# Patient Record
Sex: MALE | Race: WHITE | Employment: FULL TIME | URBAN - METROPOLITAN AREA
[De-identification: names, ages, dates, MRNs, and addresses within clinical notes are randomized per-mention and may not be internally consistent; named-entity substitution may affect disease eponyms.]

---

## 2017-04-10 ENCOUNTER — TELEPHONE (OUTPATIENT)
Dept: INTERNAL MEDICINE CLINIC | Facility: CLINIC | Age: 52
End: 2017-04-10

## 2017-04-10 DIAGNOSIS — E11.9 CONTROLLED TYPE 2 DIABETES MELLITUS WITHOUT COMPLICATION, WITHOUT LONG-TERM CURRENT USE OF INSULIN (HCC): Primary | ICD-10-CM

## 2017-04-12 NOTE — TELEPHONE ENCOUNTER
This patient has been seen by me only one time for annual physical.  Not taking any medication for cholesterol or nor for diabetes. He needs to make a follow-up appointment Call the patient and relay the information.  Thanks

## 2017-04-13 NOTE — TELEPHONE ENCOUNTER
Please read message below. They tried scheduling an appointment with the doctor but he did not want an appt. He wants to speak to you directly.

## 2017-04-13 NOTE — TELEPHONE ENCOUNTER
Pt stts he would like to speak to  to have his orders in the system. Pt trying to go tomorrow morning.  Please advise

## 2017-04-13 NOTE — TELEPHONE ENCOUNTER
Pt called to follow up. Pt asking to speak with Dr. Kee Araiza directly . Pt stts last A1c was borderline and would like to repeat lab .          Call was dropped

## 2017-04-14 ENCOUNTER — APPOINTMENT (OUTPATIENT)
Dept: LAB | Facility: HOSPITAL | Age: 52
End: 2017-04-14
Attending: INTERNAL MEDICINE
Payer: COMMERCIAL

## 2017-04-14 DIAGNOSIS — E11.9 CONTROLLED TYPE 2 DIABETES MELLITUS WITHOUT COMPLICATION, WITHOUT LONG-TERM CURRENT USE OF INSULIN (HCC): ICD-10-CM

## 2017-04-14 PROCEDURE — 83036 HEMOGLOBIN GLYCOSYLATED A1C: CPT

## 2017-04-14 PROCEDURE — 36415 COLL VENOUS BLD VENIPUNCTURE: CPT

## 2017-04-14 NOTE — TELEPHONE ENCOUNTER
Hemoglobin A1c order has been entered and completed. The patient can come anytime no need for fasting for this test.Call the patient and relay the information.  Thanks

## 2017-04-14 NOTE — TELEPHONE ENCOUNTER
Patient informed lipid panel done in 12/16. Insurance only covers every 6 months.    Patient understood and will proceed to have the [facility] done

## 2017-04-19 ENCOUNTER — TELEPHONE (OUTPATIENT)
Dept: INTERNAL MEDICINE CLINIC | Facility: CLINIC | Age: 52
End: 2017-04-19

## 2017-04-19 NOTE — TELEPHONE ENCOUNTER
Pt would like a call back with his blood test results. Per pt he had it done last Friday 4-14-17. Please,put his results thru his my chart.

## 2017-04-20 NOTE — TELEPHONE ENCOUNTER
Recent hemoglobin A1c for diabetes was 6.8 which is excellent. This means that for the last 3 months blood sugar has been well controlled. Please call the patient with the results. Thanks.

## 2017-12-30 RX ORDER — LISINOPRIL AND HYDROCHLOROTHIAZIDE 20; 12.5 MG/1; MG/1
1 TABLET ORAL DAILY
Qty: 30 TABLET | Refills: 0 | Status: SHIPPED | OUTPATIENT
Start: 2017-12-30